# Patient Record
Sex: MALE | Race: WHITE | NOT HISPANIC OR LATINO
[De-identification: names, ages, dates, MRNs, and addresses within clinical notes are randomized per-mention and may not be internally consistent; named-entity substitution may affect disease eponyms.]

---

## 2017-03-08 ENCOUNTER — APPOINTMENT (OUTPATIENT)
Dept: HEMATOLOGY ONCOLOGY | Facility: CLINIC | Age: 57
End: 2017-03-08

## 2017-03-08 VITALS
RESPIRATION RATE: 16 BRPM | SYSTOLIC BLOOD PRESSURE: 100 MMHG | WEIGHT: 244 LBS | HEART RATE: 68 BPM | BODY MASS INDEX: 36.56 KG/M2 | DIASTOLIC BLOOD PRESSURE: 60 MMHG | HEIGHT: 68.5 IN | TEMPERATURE: 98.5 F

## 2017-03-08 RX ORDER — UREA 470 MG/G
47 CREAM TOPICAL
Refills: 0 | Status: ACTIVE | COMMUNITY

## 2017-03-08 RX ORDER — CITALOPRAM 20 MG/1
20 TABLET, FILM COATED ORAL
Refills: 0 | Status: ACTIVE | COMMUNITY

## 2017-09-15 ENCOUNTER — APPOINTMENT (OUTPATIENT)
Dept: HEMATOLOGY ONCOLOGY | Facility: CLINIC | Age: 57
End: 2017-09-15
Payer: MEDICARE

## 2017-09-15 VITALS
HEART RATE: 64 BPM | TEMPERATURE: 98.5 F | SYSTOLIC BLOOD PRESSURE: 124 MMHG | BODY MASS INDEX: 37.31 KG/M2 | DIASTOLIC BLOOD PRESSURE: 70 MMHG | WEIGHT: 249 LBS

## 2017-09-15 PROCEDURE — 99213 OFFICE O/P EST LOW 20 MIN: CPT

## 2017-09-19 ENCOUNTER — APPOINTMENT (OUTPATIENT)
Dept: PHYSICAL MEDICINE AND REHAB | Facility: CLINIC | Age: 57
End: 2017-09-19
Payer: MEDICARE

## 2017-09-19 PROCEDURE — 99214 OFFICE O/P EST MOD 30 MIN: CPT

## 2017-09-19 RX ORDER — ESCITALOPRAM OXALATE 20 MG/1
20 TABLET, FILM COATED ORAL
Refills: 0 | Status: ACTIVE | COMMUNITY

## 2017-10-02 ENCOUNTER — APPOINTMENT (OUTPATIENT)
Dept: MRI IMAGING | Facility: CLINIC | Age: 57
End: 2017-10-02

## 2017-10-23 ENCOUNTER — RX RENEWAL (OUTPATIENT)
Age: 57
End: 2017-10-23

## 2017-10-24 ENCOUNTER — APPOINTMENT (OUTPATIENT)
Dept: PHYSICAL MEDICINE AND REHAB | Facility: CLINIC | Age: 57
End: 2017-10-24
Payer: MEDICARE

## 2017-10-24 VITALS
TEMPERATURE: 99.1 F | OXYGEN SATURATION: 98 % | SYSTOLIC BLOOD PRESSURE: 121 MMHG | HEART RATE: 71 BPM | DIASTOLIC BLOOD PRESSURE: 77 MMHG

## 2017-10-24 PROCEDURE — 99213 OFFICE O/P EST LOW 20 MIN: CPT

## 2017-12-07 ENCOUNTER — APPOINTMENT (OUTPATIENT)
Dept: PHYSICAL MEDICINE AND REHAB | Facility: CLINIC | Age: 57
End: 2017-12-07
Payer: MEDICARE

## 2017-12-07 VITALS
DIASTOLIC BLOOD PRESSURE: 68 MMHG | SYSTOLIC BLOOD PRESSURE: 104 MMHG | OXYGEN SATURATION: 97 % | TEMPERATURE: 98 F | HEART RATE: 61 BPM

## 2017-12-07 PROCEDURE — 99213 OFFICE O/P EST LOW 20 MIN: CPT

## 2018-02-08 ENCOUNTER — APPOINTMENT (OUTPATIENT)
Dept: PHYSICAL MEDICINE AND REHAB | Facility: CLINIC | Age: 58
End: 2018-02-08
Payer: MEDICARE

## 2018-02-08 VITALS
DIASTOLIC BLOOD PRESSURE: 76 MMHG | OXYGEN SATURATION: 99 % | SYSTOLIC BLOOD PRESSURE: 127 MMHG | HEART RATE: 66 BPM | TEMPERATURE: 97.9 F

## 2018-02-08 PROCEDURE — 99213 OFFICE O/P EST LOW 20 MIN: CPT

## 2018-02-16 ENCOUNTER — APPOINTMENT (OUTPATIENT)
Dept: HEMATOLOGY ONCOLOGY | Facility: CLINIC | Age: 58
End: 2018-02-16
Payer: MEDICARE

## 2018-02-16 VITALS
BODY MASS INDEX: 37.76 KG/M2 | TEMPERATURE: 97.4 F | SYSTOLIC BLOOD PRESSURE: 96 MMHG | WEIGHT: 252 LBS | HEART RATE: 68 BPM | DIASTOLIC BLOOD PRESSURE: 60 MMHG

## 2018-02-16 PROCEDURE — 99213 OFFICE O/P EST LOW 20 MIN: CPT

## 2018-09-17 ENCOUNTER — APPOINTMENT (OUTPATIENT)
Dept: HEMATOLOGY ONCOLOGY | Facility: CLINIC | Age: 58
End: 2018-09-17
Payer: MEDICARE

## 2018-09-17 VITALS
BODY MASS INDEX: 37.76 KG/M2 | TEMPERATURE: 98 F | HEART RATE: 66 BPM | DIASTOLIC BLOOD PRESSURE: 76 MMHG | SYSTOLIC BLOOD PRESSURE: 110 MMHG | WEIGHT: 252 LBS

## 2018-09-17 PROCEDURE — 99213 OFFICE O/P EST LOW 20 MIN: CPT

## 2019-01-03 ENCOUNTER — CHART COPY (OUTPATIENT)
Age: 59
End: 2019-01-03

## 2019-08-21 ENCOUNTER — CHART COPY (OUTPATIENT)
Age: 59
End: 2019-08-21

## 2019-08-22 ENCOUNTER — OUTPATIENT (OUTPATIENT)
Dept: OUTPATIENT SERVICES | Facility: HOSPITAL | Age: 59
LOS: 1 days | End: 2019-08-22
Payer: MEDICARE

## 2019-08-22 VITALS
HEART RATE: 68 BPM | TEMPERATURE: 98 F | DIASTOLIC BLOOD PRESSURE: 80 MMHG | RESPIRATION RATE: 20 BRPM | WEIGHT: 255.07 LBS | OXYGEN SATURATION: 96 % | HEIGHT: 67 IN | SYSTOLIC BLOOD PRESSURE: 116 MMHG

## 2019-08-22 DIAGNOSIS — K02.62 DENTAL CARIES ON SMOOTH SURFACE PENETRATING INTO DENTIN: ICD-10-CM

## 2019-08-22 DIAGNOSIS — D63.8 ANEMIA IN OTHER CHRONIC DISEASES CLASSIFIED ELSEWHERE: ICD-10-CM

## 2019-08-22 DIAGNOSIS — F79 UNSPECIFIED INTELLECTUAL DISABILITIES: ICD-10-CM

## 2019-08-22 DIAGNOSIS — K02.9 DENTAL CARIES, UNSPECIFIED: ICD-10-CM

## 2019-08-22 DIAGNOSIS — Z01.818 ENCOUNTER FOR OTHER PREPROCEDURAL EXAMINATION: ICD-10-CM

## 2019-08-22 DIAGNOSIS — Z98.1 ARTHRODESIS STATUS: Chronic | ICD-10-CM

## 2019-08-22 DIAGNOSIS — Z86.39 PERSONAL HISTORY OF OTHER ENDOCRINE, NUTRITIONAL AND METABOLIC DISEASE: ICD-10-CM

## 2019-08-22 DIAGNOSIS — K05.6 PERIODONTAL DISEASE, UNSPECIFIED: ICD-10-CM

## 2019-08-22 LAB
ANION GAP SERPL CALC-SCNC: 11 MMOL/L — SIGNIFICANT CHANGE UP (ref 5–17)
BUN SERPL-MCNC: 12 MG/DL — SIGNIFICANT CHANGE UP (ref 7–23)
CALCIUM SERPL-MCNC: 10.1 MG/DL — SIGNIFICANT CHANGE UP (ref 8.4–10.5)
CHLORIDE SERPL-SCNC: 101 MMOL/L — SIGNIFICANT CHANGE UP (ref 96–108)
CO2 SERPL-SCNC: 25 MMOL/L — SIGNIFICANT CHANGE UP (ref 22–31)
CREAT SERPL-MCNC: 0.66 MG/DL — SIGNIFICANT CHANGE UP (ref 0.5–1.3)
GLUCOSE SERPL-MCNC: 97 MG/DL — SIGNIFICANT CHANGE UP (ref 70–99)
HCT VFR BLD CALC: 38.2 % — LOW (ref 39–50)
HGB BLD-MCNC: 12.3 G/DL — LOW (ref 13–17)
MCHC RBC-ENTMCNC: 30.8 PG — SIGNIFICANT CHANGE UP (ref 27–34)
MCHC RBC-ENTMCNC: 32.2 GM/DL — SIGNIFICANT CHANGE UP (ref 32–36)
MCV RBC AUTO: 95.7 FL — SIGNIFICANT CHANGE UP (ref 80–100)
PLATELET # BLD AUTO: 321 K/UL — SIGNIFICANT CHANGE UP (ref 150–400)
POTASSIUM SERPL-MCNC: 4.1 MMOL/L — SIGNIFICANT CHANGE UP (ref 3.5–5.3)
POTASSIUM SERPL-SCNC: 4.1 MMOL/L — SIGNIFICANT CHANGE UP (ref 3.5–5.3)
RBC # BLD: 3.99 M/UL — LOW (ref 4.2–5.8)
RBC # FLD: 14.3 % — SIGNIFICANT CHANGE UP (ref 10.3–14.5)
SODIUM SERPL-SCNC: 137 MMOL/L — SIGNIFICANT CHANGE UP (ref 135–145)
WBC # BLD: 7.17 K/UL — SIGNIFICANT CHANGE UP (ref 3.8–10.5)
WBC # FLD AUTO: 7.17 K/UL — SIGNIFICANT CHANGE UP (ref 3.8–10.5)

## 2019-08-22 PROCEDURE — G0463: CPT

## 2019-08-22 PROCEDURE — 85027 COMPLETE CBC AUTOMATED: CPT

## 2019-08-22 PROCEDURE — 71045 X-RAY EXAM CHEST 1 VIEW: CPT

## 2019-08-22 PROCEDURE — 80048 BASIC METABOLIC PNL TOTAL CA: CPT

## 2019-08-22 PROCEDURE — 71045 X-RAY EXAM CHEST 1 VIEW: CPT | Mod: 26

## 2019-08-22 NOTE — H&P PST ADULT - HISTORY OF PRESENT ILLNESS
58 year old male with intellectual disability, lives in group home ([phone]), presents to preadmission accompanied by home aid, for schedule comprehensive dental treatment for dental caries/ peridental disease on 9/4/2019. Patient also with h/o anemia of chronic disease, osteoporosis, mentally challenged, history of cervical spine fusion (unknown level), hypothyroidism.

## 2019-08-22 NOTE — H&P PST ADULT - NSICDXPASTMEDICALHX_GEN_ALL_CORE_FT
PAST MEDICAL HISTORY:  Anemia of chronic disease     Cervical myelopathy s/p cervical spine fusion    Dental caries     H/O: hypothyroidism     Hypercholesterolemia     Mentally challenged

## 2019-08-22 NOTE — H&P PST ADULT - NSICDXPROBLEM_GEN_ALL_CORE_FT
PROBLEM DIAGNOSES  Problem: Dental caries  Assessment and Plan: scheduled for comprehensive dental treatment   preop instruction discussed with both patient and his aid    Problem: Anemia of chronic disease  Assessment and Plan: Monitor     Problem: H/O: hypothyroidism  Assessment and Plan: will continue thyroid medication [id number]    Problem: Mentally challenged  Assessment and Plan: maintain safety

## 2019-08-22 NOTE — H&P PST ADULT - NSANTHOSAYNRD_GEN_A_CORE
No. MOOSE screening performed.  STOP BANG Legend: 0-2 = LOW Risk; 3-4 = INTERMEDIATE Risk; 5-8 = HIGH Risk

## 2019-09-03 ENCOUNTER — TRANSCRIPTION ENCOUNTER (OUTPATIENT)
Age: 59
End: 2019-09-03

## 2019-09-03 NOTE — ASU DISCHARGE PLAN (ADULT/PEDIATRIC) - CALL YOUR DOCTOR IF YOU HAVE ANY OF THE FOLLOWING:
Pain not relieved by Medications/Fever greater than (need to indicate Fahrenheit or Celsius)/Swelling that gets worse/Bleeding that does not stop

## 2019-09-03 NOTE — ASU DISCHARGE PLAN (ADULT/PEDIATRIC) - ASU DC SPECIAL INSTRUCTIONSFT
comprehensive dental tx under ga performed    tylenol prn pain     see Dr Ma in one week    patient can return to program tomorrow

## 2019-09-04 ENCOUNTER — OUTPATIENT (OUTPATIENT)
Dept: OUTPATIENT SERVICES | Facility: HOSPITAL | Age: 59
LOS: 1 days | End: 2019-09-04
Payer: MEDICARE

## 2019-09-04 VITALS
OXYGEN SATURATION: 98 % | WEIGHT: 255.07 LBS | HEART RATE: 74 BPM | TEMPERATURE: 98 F | SYSTOLIC BLOOD PRESSURE: 133 MMHG | HEIGHT: 67 IN | DIASTOLIC BLOOD PRESSURE: 80 MMHG | RESPIRATION RATE: 20 BRPM

## 2019-09-04 VITALS
TEMPERATURE: 98 F | OXYGEN SATURATION: 98 % | RESPIRATION RATE: 19 BRPM | SYSTOLIC BLOOD PRESSURE: 138 MMHG | HEART RATE: 71 BPM | DIASTOLIC BLOOD PRESSURE: 72 MMHG

## 2019-09-04 DIAGNOSIS — K02.62 DENTAL CARIES ON SMOOTH SURFACE PENETRATING INTO DENTIN: ICD-10-CM

## 2019-09-04 DIAGNOSIS — Z98.1 ARTHRODESIS STATUS: Chronic | ICD-10-CM

## 2019-09-04 DIAGNOSIS — K05.6 PERIODONTAL DISEASE, UNSPECIFIED: ICD-10-CM

## 2019-09-04 PROCEDURE — D2332: CPT

## 2019-09-04 PROCEDURE — D4342: CPT

## 2019-09-04 PROCEDURE — D7311: CPT

## 2019-09-04 PROCEDURE — D7210: CPT

## 2019-09-04 PROCEDURE — D4211: CPT

## 2019-09-04 PROCEDURE — D4341: CPT

## 2019-09-04 PROCEDURE — C1889: CPT

## 2019-09-04 PROCEDURE — D7310: CPT

## 2019-09-04 PROCEDURE — D2335: CPT

## 2019-09-04 RX ORDER — CHOLECALCIFEROL (VITAMIN D3) 125 MCG
1 CAPSULE ORAL
Qty: 0 | Refills: 0 | DISCHARGE

## 2019-09-04 RX ORDER — ESCITALOPRAM OXALATE 10 MG/1
1 TABLET, FILM COATED ORAL
Qty: 0 | Refills: 0 | DISCHARGE

## 2019-09-04 RX ORDER — OMEGA-3 ACID ETHYL ESTERS 1 G
1 CAPSULE ORAL
Qty: 0 | Refills: 0 | DISCHARGE

## 2019-09-04 RX ORDER — SOD,AMMONIUM,POTASSIUM LACTATE
1 CREAM (GRAM) TOPICAL
Qty: 0 | Refills: 0 | DISCHARGE

## 2019-09-04 RX ORDER — ACETAMINOPHEN 500 MG
1000 TABLET ORAL ONCE
Refills: 0 | Status: DISCONTINUED | OUTPATIENT
Start: 2019-09-04 | End: 2019-09-19

## 2019-09-04 RX ORDER — SODIUM CHLORIDE 9 MG/ML
1000 INJECTION, SOLUTION INTRAVENOUS
Refills: 0 | Status: DISCONTINUED | OUTPATIENT
Start: 2019-09-04 | End: 2019-09-19

## 2019-09-04 RX ORDER — ONDANSETRON 8 MG/1
4 TABLET, FILM COATED ORAL ONCE
Refills: 0 | Status: DISCONTINUED | OUTPATIENT
Start: 2019-09-04 | End: 2019-09-19

## 2019-09-04 RX ORDER — DUTASTERIDE 0.5 MG/1
1 CAPSULE, LIQUID FILLED ORAL
Qty: 0 | Refills: 0 | DISCHARGE

## 2019-09-04 RX ORDER — LEVOTHYROXINE SODIUM 125 MCG
1 TABLET ORAL
Qty: 0 | Refills: 0 | DISCHARGE

## 2019-09-04 RX ORDER — KETOCONAZOLE 20 MG/G
1 AEROSOL, FOAM TOPICAL
Qty: 0 | Refills: 0 | DISCHARGE

## 2019-09-04 NOTE — ASU PATIENT PROFILE, ADULT - VISION (WITH CORRECTIVE LENSES IF THE PATIENT USUALLY WEARS THEM):
glasses glasses/Partially impaired: cannot see medication labels or newsprint, but can see obstacles in path, and the surrounding layout; can count fingers at arm's length

## 2019-09-04 NOTE — ASU PATIENT PROFILE, ADULT - PMH
Anemia of chronic disease    Cervical myelopathy  s/p cervical spine fusion  Dental caries    H/O: hypothyroidism    Hypercholesterolemia    Mentally challenged

## 2019-09-04 NOTE — ASU PATIENT PROFILE, ADULT - ABILITY TO HEAR (WITH HEARING AID OR HEARING APPLIANCE IF NORMALLY USED):
Adequate: hears normal conversation without difficulty/hearing aid Adequate: hears normal conversation without difficulty

## 2021-06-24 NOTE — H&P PST ADULT - URINARY CATHETER
INR 2.1 After talking with pt and discussing history of greens/salads and medication change. Pt will  continue  with current diet and dosing of Warfarin.  Continue with moderation of Vit K and green leafy vegetables. Cautioned to call with increase bruising or bleeding. Reminded to call with medication change especially antibiotic. Call with any questions or concerns or any up coming procedures. Cautioned about using Herbal medication.    
no

## 2021-07-13 PROBLEM — G95.9 DISEASE OF SPINAL CORD, UNSPECIFIED: Chronic | Status: ACTIVE | Noted: 2019-08-22

## 2021-07-13 PROBLEM — Z86.39 PERSONAL HISTORY OF OTHER ENDOCRINE, NUTRITIONAL AND METABOLIC DISEASE: Chronic | Status: ACTIVE | Noted: 2019-08-22

## 2021-07-13 PROBLEM — E78.00 PURE HYPERCHOLESTEROLEMIA, UNSPECIFIED: Chronic | Status: ACTIVE | Noted: 2019-08-22

## 2021-07-13 PROBLEM — K02.9 DENTAL CARIES, UNSPECIFIED: Chronic | Status: ACTIVE | Noted: 2019-08-22

## 2021-07-13 PROBLEM — D63.8 ANEMIA IN OTHER CHRONIC DISEASES CLASSIFIED ELSEWHERE: Chronic | Status: ACTIVE | Noted: 2019-08-22

## 2021-07-13 PROBLEM — F79 UNSPECIFIED INTELLECTUAL DISABILITIES: Chronic | Status: ACTIVE | Noted: 2019-08-22

## 2021-08-05 ENCOUNTER — APPOINTMENT (OUTPATIENT)
Dept: PHYSICAL MEDICINE AND REHAB | Facility: CLINIC | Age: 61
End: 2021-08-05
Payer: COMMERCIAL

## 2021-08-05 VITALS
DIASTOLIC BLOOD PRESSURE: 69 MMHG | TEMPERATURE: 97.7 F | HEART RATE: 70 BPM | SYSTOLIC BLOOD PRESSURE: 118 MMHG | OXYGEN SATURATION: 100 %

## 2021-08-05 PROCEDURE — 99072 ADDL SUPL MATRL&STAF TM PHE: CPT

## 2021-08-05 PROCEDURE — 99213 OFFICE O/P EST LOW 20 MIN: CPT | Mod: GC

## 2021-08-05 NOTE — ASSESSMENT
[FreeTextEntry1] : Patient is a 60-year-old male history of MR, cervical myelopathy, cervical fusion who has suffered to falls in the past one month which appear to be isolated events secondary to cognitive impairment. Clinical exam appears neurologically stable with chronic myelopathic dysfunction. Recommend a course of physical therapy with emphasis on a balance exercises, core strengthening exercises and ambulation training with rolling walker, see RX. Recommend orthopedic spine followup to assess cervical spine hardware.\par \par I spent a total of 20 minutes on the date of the encounter evaluating and treating the patient including the discussion of treatment options.

## 2021-08-05 NOTE — HISTORY OF PRESENT ILLNESS
[FreeTextEntry1] : Patient is a 60-year-old male history of MR, cervical myelopathy, cervical fusion who presents today for evaluation after suffering 2 falls in the past one month. Aid reports that one fall occurred when transferring onto the toilet and falling onto the floor, the second fall occurred when transferring out of a motorized a vehicle. Near falls tend to occurred due to patient not paying attention or not using his rolling walker. Patient denies pain at the neck or extremities. Patient had urgent care visit with x-rays which were reported to be negative. No focal motor weakness noted.

## 2021-08-05 NOTE — PHYSICAL EXAM
[FreeTextEntry1] : General: Well-developed male in no apparent distress. Patient is awake, alert, and follow simple commands. Patient cooperative with examination.\par \par Motor:\par Both upper extremities: Tone normal, active range of motion within functional limits with 5/5 motor power throughout.\par Both lower extremities: Tone normal, active range of motion within functional limits with 5/5 motor power throughout.\par \par Sensory: Intact the tactile stimulation in both lower extremities.\par \par Muscle stretch reflexes: +2/3 KJ, +2 AJ and symmetric. right biceps +1, right BR +2, right TR +2/3.\par left biceps 0/+1, left BR,0/+1, left TR +2/3. (+)left inverted radial reflex.\par No Babinski on left, (+) on right.\par \par Functional status: Patient ambulated with RW with mildly wide based gait with supervision. Patient was able to manage the rolling walker well. shallow HS bilaterally, no slap.

## 2021-08-05 NOTE — REVIEW OF SYSTEMS
[Negative] : Gastrointestinal [Fever] : no fever [Incontinence] : no incontinence [Joint Pain] : no joint pain [Muscle Weakness] : no muscle weakness

## 2021-08-18 ENCOUNTER — APPOINTMENT (OUTPATIENT)
Dept: ORTHOPEDIC SURGERY | Facility: CLINIC | Age: 61
End: 2021-08-18
Payer: COMMERCIAL

## 2021-08-18 VITALS — HEART RATE: 67 BPM | DIASTOLIC BLOOD PRESSURE: 69 MMHG | HEIGHT: 68.5 IN | SYSTOLIC BLOOD PRESSURE: 120 MMHG

## 2021-08-18 PROCEDURE — 99072 ADDL SUPL MATRL&STAF TM PHE: CPT

## 2021-08-18 PROCEDURE — 99203 OFFICE O/P NEW LOW 30 MIN: CPT

## 2021-08-18 NOTE — HISTORY OF PRESENT ILLNESS
[de-identified] : Mr. JACOB RAMIREZ  is a 60 year old male who presents to the office for a cervical spine evaluation.  He has had 2 falls over the past 2 months and has a history of cervical surgery.  Denies any neck pain or any UE radicular symptoms.  Normal bowel and bladder control.   Denies any recent fevers, chills, sweats, weight loss, or infection.  He is here with his home health aide secondary to cognitive limitations. \par \par The patients past medical history, past surgical history, medications, allergies, and social history were reviewed by me today with the patient and documented accordingly.  In addition, the patient's family history, which is noncontributory to their visit, was also reviewed.\par

## 2021-08-18 NOTE — PHYSICAL EXAM
[Walker] : ambulates with walker [de-identified] : Examination of the cervical spine reveals no midline or paraspinal tenderness to palpation.  Healed posterior cervical incision no cervical lymphadenopathy. Decreased range of motion with respect to flexion, extension, rotation, and lateral bending. Negative Spurlings. Negative Lhermitte's. Full range of motion bilateral shoulders without evidence of impingement. No instability of bilateral upper extremities.  Cranial nerves II through XII grossly intact. Intact sensation bilateral upper extremities.  4+ /5 deltoids biceps triceps wrist extensors wrist flexors finger flexors and hand intrinsics. 1+ biceps triceps and brachioradialis reflexes.  Equivocal Cobos's. 2+ radial pulse. Negative Tinel's over the cubital and carpal tunnel. No skin lesions on the right and left upper extremities. [de-identified] : AP lateral cervical x-rays reveals prior posterior cervical fusion.  He appears to have a solid arthrodesis.

## 2021-08-18 NOTE — DISCUSSION/SUMMARY
[de-identified] : Given his worsening symptoms an updated cervical spine MRI will be obtained.  Follow-up afterwards

## 2021-09-20 ENCOUNTER — APPOINTMENT (OUTPATIENT)
Dept: PHYSICAL MEDICINE AND REHAB | Facility: CLINIC | Age: 61
End: 2021-09-20
Payer: COMMERCIAL

## 2021-09-20 VITALS
DIASTOLIC BLOOD PRESSURE: 71 MMHG | OXYGEN SATURATION: 97 % | HEART RATE: 81 BPM | TEMPERATURE: 36 F | SYSTOLIC BLOOD PRESSURE: 122 MMHG

## 2021-09-20 PROCEDURE — 99072 ADDL SUPL MATRL&STAF TM PHE: CPT

## 2021-09-20 PROCEDURE — 99213 OFFICE O/P EST LOW 20 MIN: CPT

## 2021-09-20 NOTE — PHYSICAL EXAM
[FreeTextEntry1] : General: Well-developed male in no apparent distress. Patient is awake, alert, and follow simple commands. Patient cooperative with examination.\par \par Motor:\par Both upper extremities: Tone normal, active range of motion within functional limits with 5/5 motor power throughout.\par Both lower extremities: Tone normal, active range of motion within functional limits with hip flexion 4+/5mp, otherwise 5/5 motor power throughout.\par \par Sensory: Intact the tactile stimulation in both lower extremities.\par \par Muscle stretch reflexes: +2/3 KJ, +2 AJ and symmetric. right biceps +1, right BR +2, right TR +2/3.\par left biceps 0/+1, left BR,0/+1, left TR +2/3. (+)bilateral inverted radial reflex.\par No Babinski on left, (+) on right.\par \par Functional status: Patient ambulated with RW with mildly wide based gait with supervision. Patient was able to manage the rolling walker well. shallow HS bilaterally, no slap.

## 2021-09-20 NOTE — ASSESSMENT
[FreeTextEntry1] : Patient is a 61-year-old male history of MR, cervical myelopathy, cervical fusion whose clinical exam appears neurologically stable with chronic myelopathic dysfunction. MRI Cervical spine scheduled on 9/27/21 and encouraged orthopedic f/u after MRI. No falls since last visit and recommend continued physical therapy. \par \par I spent a total of 20 minutes on the date of the encounter evaluating and treating the patient including the discussion of treatment options.

## 2021-09-20 NOTE — HISTORY OF PRESENT ILLNESS
[FreeTextEntry1] : Patient is a 61-year-old male history of MR, cervical myelopathy, cervical fusion who was last seen August 5, 2021. Patient seen by ortho Spine and MRI C-spine scheduled for 9/27/21.  Aid reports patient has been receiving PT 2x/week, no falls since last visit. 1 Near fall reported in PT. Patient denies pain at the neck or extremities. Patient denies focal motor weakness, numbness or B/B incontinence. Patient functionally ambulates in his group home with a rolling walker with distant supervision. Patient's independent transfers, independent toileting, independent dressing.

## 2021-09-27 ENCOUNTER — APPOINTMENT (OUTPATIENT)
Dept: MRI IMAGING | Facility: CLINIC | Age: 61
End: 2021-09-27

## 2021-11-01 ENCOUNTER — APPOINTMENT (OUTPATIENT)
Dept: PHYSICAL MEDICINE AND REHAB | Facility: CLINIC | Age: 61
End: 2021-11-01
Payer: COMMERCIAL

## 2021-11-01 VITALS
OXYGEN SATURATION: 97 % | DIASTOLIC BLOOD PRESSURE: 78 MMHG | TEMPERATURE: 96.4 F | HEART RATE: 59 BPM | SYSTOLIC BLOOD PRESSURE: 118 MMHG

## 2021-11-01 DIAGNOSIS — Z98.1 ARTHRODESIS STATUS: ICD-10-CM

## 2021-11-01 DIAGNOSIS — R26.0 ATAXIC GAIT: ICD-10-CM

## 2021-11-01 PROCEDURE — 99072 ADDL SUPL MATRL&STAF TM PHE: CPT

## 2021-11-01 PROCEDURE — 99213 OFFICE O/P EST LOW 20 MIN: CPT

## 2021-11-01 NOTE — HISTORY OF PRESENT ILLNESS
[FreeTextEntry1] : Patient is a 61-year-old male history of MR, cervical myelopathy, cervical fusion who was last seen Sept. 20, 2021. Patient seen by ortho Spine and MRI C-spine preformed on 10/27/21.  Aid reports patient has been receiving PT 2x/week, no falls since last visit. Patient denies pain at the neck or extremities. Patient denies focal motor weakness, numbness or B/B incontinence. Patient functionally ambulates in his group home with a rolling walker with distant supervision. Patient's independent transfers, independent toileting, independent dressing.

## 2021-11-01 NOTE — ASSESSMENT
[FreeTextEntry1] : Patient is a 61-year-old male history of MR, cervical myelopathy, cervical fusion whose clinical exam appears neurologically stable with chronic myelopathic dysfunction. MRI Cervical spine obtained on 10/27/21 and results pending, encouraged orthopedic f/u after MRI. No falls since last visit and recommend continued physical therapy. \par \par I spent a total of 20 minutes on the date of the encounter evaluating and treating the patient including the discussion of treatment options.

## 2021-11-01 NOTE — PHYSICAL EXAM
[FreeTextEntry1] : General: Well-developed male in no apparent distress. Patient is awake, alert, and follow simple commands. Patient cooperative with examination.\par \par Motor:\par Both upper extremities: Tone normal, active range of motion within functional limits with 5/5 motor power throughout.\par Both lower extremities: Tone normal, active range of motion within functional limits with hip flexion 4+/5mp, otherwise 5/5 motor power throughout.\par \par Sensory: Intact the tactile stimulation in both lower extremities.\par \par Muscle stretch reflexes: +2/3 KJ, +2 AJ and symmetric. right biceps +1, right BR +2, right TR +2/3.\par left biceps 0/+1, left BR,0/+1, left TR +2/3. (+)bilateral inverted radial reflex. (+)Cobos sign (B)\par No Babinski on left, (+) on right.\par \par Functional status: Patient ambulated with RW with mildly wide based gait with supervision. Patient was able to manage the rolling walker well. shallow HS bilaterally, no slap.

## 2021-11-09 ENCOUNTER — TRANSCRIPTION ENCOUNTER (OUTPATIENT)
Age: 61
End: 2021-11-09

## 2021-11-10 ENCOUNTER — RESULT REVIEW (OUTPATIENT)
Age: 61
End: 2021-11-10

## 2021-11-10 ENCOUNTER — OUTPATIENT (OUTPATIENT)
Dept: OUTPATIENT SERVICES | Facility: HOSPITAL | Age: 61
LOS: 1 days | End: 2021-11-10
Payer: MEDICARE

## 2021-11-10 DIAGNOSIS — D64.9 ANEMIA, UNSPECIFIED: ICD-10-CM

## 2021-11-10 DIAGNOSIS — Z98.1 ARTHRODESIS STATUS: Chronic | ICD-10-CM

## 2021-11-10 DIAGNOSIS — K44.9 DIAPHRAGMATIC HERNIA WITHOUT OBSTRUCTION OR GANGRENE: ICD-10-CM

## 2021-11-10 DIAGNOSIS — Z12.11 ENCOUNTER FOR SCREENING FOR MALIGNANT NEOPLASM OF COLON: ICD-10-CM

## 2021-11-10 PROCEDURE — 82962 GLUCOSE BLOOD TEST: CPT

## 2021-11-10 PROCEDURE — 88305 TISSUE EXAM BY PATHOLOGIST: CPT | Mod: 26

## 2021-11-10 PROCEDURE — 88305 TISSUE EXAM BY PATHOLOGIST: CPT

## 2021-11-10 PROCEDURE — 45380 COLONOSCOPY AND BIOPSY: CPT

## 2021-11-12 ENCOUNTER — APPOINTMENT (OUTPATIENT)
Dept: ORTHOPEDIC SURGERY | Facility: CLINIC | Age: 61
End: 2021-11-12
Payer: COMMERCIAL

## 2021-11-12 VITALS — WEIGHT: 220 LBS | HEIGHT: 69 IN | BODY MASS INDEX: 32.58 KG/M2

## 2021-11-12 LAB — SURGICAL PATHOLOGY STUDY: SIGNIFICANT CHANGE UP

## 2021-11-12 PROCEDURE — 99072 ADDL SUPL MATRL&STAF TM PHE: CPT

## 2021-11-12 PROCEDURE — 99214 OFFICE O/P EST MOD 30 MIN: CPT

## 2021-11-12 NOTE — PHYSICAL EXAM
[Walker] : ambulates with walker [de-identified] : Examination of the cervical spine reveals no midline or paraspinal tenderness to palpation.  Healed posterior cervical incision no cervical lymphadenopathy. Decreased range of motion with respect to flexion, extension, rotation, and lateral bending. Negative Spurlings. Negative Lhermitte's. Full range of motion bilateral shoulders without evidence of impingement. No instability of bilateral upper extremities.  Cranial nerves II through XII grossly intact. Intact sensation bilateral upper extremities.  4+ /5 deltoids biceps triceps wrist extensors wrist flexors finger flexors and hand intrinsics. 1+ biceps triceps and brachioradialis reflexes.  Equivocal Cobos's. 2+ radial pulse. Negative Tinel's over the cubital and carpal tunnel. No skin lesions on the right and left upper extremities. [de-identified] : AP lateral cervical x-rays reveals prior posterior cervical fusion.  He appears to have a solid arthrodesis.\par \par Recent MRI demonstrates adequate decompression

## 2021-11-12 NOTE — HISTORY OF PRESENT ILLNESS
[de-identified] : Mr. JACOB RAMIREZ  is a 60 year old male who presents to the office for a follow-up visit.  He is here to review his cervical MRI.  \par

## 2021-11-12 NOTE — DISCUSSION/SUMMARY
[de-identified] : We discussed further treatment options.  We reviewed his imaging.  He does not appear to have new areas of significant stenosis.  He will continue with nonsurgical treatment.  He will let me know of any changes or worsening of his symptoms.

## 2021-12-20 ENCOUNTER — APPOINTMENT (OUTPATIENT)
Dept: PHYSICAL MEDICINE AND REHAB | Facility: CLINIC | Age: 61
End: 2021-12-20

## 2023-03-21 ENCOUNTER — APPOINTMENT (OUTPATIENT)
Dept: GASTROENTEROLOGY | Facility: CLINIC | Age: 63
End: 2023-03-21
Payer: COMMERCIAL

## 2023-03-21 VITALS
DIASTOLIC BLOOD PRESSURE: 59 MMHG | HEART RATE: 61 BPM | BODY MASS INDEX: 33.8 KG/M2 | WEIGHT: 223 LBS | HEIGHT: 68 IN | OXYGEN SATURATION: 97 % | SYSTOLIC BLOOD PRESSURE: 119 MMHG

## 2023-03-21 DIAGNOSIS — D63.8 ANEMIA IN OTHER CHRONIC DISEASES CLASSIFIED ELSEWHERE: ICD-10-CM

## 2023-03-21 DIAGNOSIS — Z12.11 ENCOUNTER FOR SCREENING FOR MALIGNANT NEOPLASM OF COLON: ICD-10-CM

## 2023-03-21 PROCEDURE — 99203 OFFICE O/P NEW LOW 30 MIN: CPT

## 2023-03-21 RX ORDER — SODIUM SULFATE, POTASSIUM SULFATE AND MAGNESIUM SULFATE 1.6; 3.13; 17.5 G/177ML; G/177ML; G/177ML
17.5-3.13-1.6 SOLUTION ORAL
Qty: 1 | Refills: 0 | Status: DISCONTINUED | COMMUNITY
Start: 2023-03-21 | End: 2023-03-21

## 2023-03-21 NOTE — ASSESSMENT
[FreeTextEntry1] : 62-year-old male with history of chronic anemia whose last colonoscopy performed in 2021 was normal.  Repeat colonoscopy was recommended in 10 years in 2031.  An upper endoscopy performed last year revealed antral intestinal metaplasia.  Have recommended repeat upper endoscopy, CBC, serum ferritin and iron studies.

## 2023-03-21 NOTE — HISTORY OF PRESENT ILLNESS
[de-identified] : Upper endoscopy performed January 13, 2022 revealed a small hiatal hernia and antral reactive gastropathy with intestinal metaplasia and reflux esophagitis [FreeTextEntry1] : Colonoscopy performed by Dr. Parker Thomas on December 17, 2015 revealed internal hemorrhoids, no polyps noted\par Colonoscopy performed November 10, 2021 revealed normal terminal ileum biopsy

## 2023-03-21 NOTE — REASON FOR VISIT
[Initial Evaluation] : an initial evaluation [Formal Caregiver] : formal caregiver [FreeTextEntry1] : 62-year-old male with a history of chronic anemia returns with his caregiver for routine follow-up visit

## 2023-03-21 NOTE — CONSULT LETTER
[Dear  ___] : Dear  [unfilled], [Consult Letter:] : I had the pleasure of evaluating your patient, [unfilled]. [Please see my note below.] : Please see my note below. [Consult Closing:] : Thank you very much for allowing me to participate in the care of this patient.  If you have any questions, please do not hesitate to contact me. [FreeTextEntry2] : Dr. Sarah Thapa

## 2023-03-21 NOTE — PHYSICAL EXAM
[None] : no edema [Normal] : normal bowel sounds, non-tender, no masses, soft, no no hepato-splenomegaly [de-identified] : Deferred

## 2023-03-31 LAB
BASOPHILS # BLD AUTO: 0.06 K/UL
BASOPHILS NFR BLD AUTO: 0.6 %
EOSINOPHIL # BLD AUTO: 0.07 K/UL
EOSINOPHIL NFR BLD AUTO: 0.7 %
HCT VFR BLD CALC: 37.2 %
HGB BLD-MCNC: 11.7 G/DL
IMM GRANULOCYTES NFR BLD AUTO: 0.6 %
LYMPHOCYTES # BLD AUTO: 2.22 K/UL
LYMPHOCYTES NFR BLD AUTO: 23.3 %
MAN DIFF?: NORMAL
MCHC RBC-ENTMCNC: 30.2 PG
MCHC RBC-ENTMCNC: 31.5 GM/DL
MCV RBC AUTO: 95.9 FL
MONOCYTES # BLD AUTO: 0.93 K/UL
MONOCYTES NFR BLD AUTO: 9.8 %
NEUTROPHILS # BLD AUTO: 6.18 K/UL
NEUTROPHILS NFR BLD AUTO: 65 %
PLATELET # BLD AUTO: 279 K/UL
RBC # BLD: 3.88 M/UL
RBC # FLD: 14.5 %
WBC # FLD AUTO: 9.52 K/UL

## 2023-04-03 LAB
FERRITIN SERPL-MCNC: 95 NG/ML
IRON SATN MFR SERPL: 12 %
IRON SERPL-MCNC: 33 UG/DL
TIBC SERPL-MCNC: 279 UG/DL
UIBC SERPL-MCNC: 246 UG/DL

## 2023-04-26 ENCOUNTER — TRANSCRIPTION ENCOUNTER (OUTPATIENT)
Age: 63
End: 2023-04-26

## 2023-04-27 ENCOUNTER — OUTPATIENT (OUTPATIENT)
Dept: OUTPATIENT SERVICES | Facility: HOSPITAL | Age: 63
LOS: 1 days | End: 2023-04-27
Payer: MEDICARE

## 2023-04-27 ENCOUNTER — RESULT REVIEW (OUTPATIENT)
Age: 63
End: 2023-04-27

## 2023-04-27 ENCOUNTER — APPOINTMENT (OUTPATIENT)
Dept: GASTROENTEROLOGY | Facility: HOSPITAL | Age: 63
End: 2023-04-27

## 2023-04-27 DIAGNOSIS — K21.9 GASTRO-ESOPHAGEAL REFLUX DISEASE WITHOUT ESOPHAGITIS: ICD-10-CM

## 2023-04-27 DIAGNOSIS — Z98.1 ARTHRODESIS STATUS: Chronic | ICD-10-CM

## 2023-04-27 PROCEDURE — 43239 EGD BIOPSY SINGLE/MULTIPLE: CPT

## 2023-04-27 PROCEDURE — 82962 GLUCOSE BLOOD TEST: CPT

## 2023-04-27 PROCEDURE — 88305 TISSUE EXAM BY PATHOLOGIST: CPT

## 2023-04-27 PROCEDURE — 88313 SPECIAL STAINS GROUP 2: CPT | Mod: 26

## 2023-04-27 PROCEDURE — 88305 TISSUE EXAM BY PATHOLOGIST: CPT | Mod: 26

## 2023-04-27 PROCEDURE — 88313 SPECIAL STAINS GROUP 2: CPT

## 2023-04-27 PROCEDURE — 88312 SPECIAL STAINS GROUP 1: CPT | Mod: 26

## 2023-04-27 PROCEDURE — 88312 SPECIAL STAINS GROUP 1: CPT

## 2023-04-28 LAB — SURGICAL PATHOLOGY STUDY: SIGNIFICANT CHANGE UP

## 2023-05-08 ENCOUNTER — APPOINTMENT (OUTPATIENT)
Dept: GASTROENTEROLOGY | Facility: CLINIC | Age: 63
End: 2023-05-08
Payer: COMMERCIAL

## 2023-05-08 DIAGNOSIS — K31.A0 GASTRIC INTESTINAL METAPLASIA, UNSPECIFIED: ICD-10-CM

## 2023-05-08 DIAGNOSIS — K29.50 UNSPECIFIED CHRONIC GASTRITIS W/OUT BLEEDING: ICD-10-CM

## 2023-05-08 PROCEDURE — 99213 OFFICE O/P EST LOW 20 MIN: CPT

## 2023-05-08 NOTE — ASSESSMENT
[FreeTextEntry1] : Patient is a 62-year-old male with a history of gastric intestinal metaplasia who on most recent endoscopy in April there was no evidence for gastric intestinal metaplasia.  Patient has no symptoms.  Will advise patient to follow-up in 1 year.  As his last colonoscopy was performed in 2015 and was negative for polyps will recommend that patient repeat a colonoscopy in 2025.

## 2023-05-08 NOTE — PHYSICAL EXAM
[None] : no edema [Normal] : normal bowel sounds, non-tender, no masses, soft, no no hepato-splenomegaly [de-identified] : Referred

## 2023-05-08 NOTE — HISTORY OF PRESENT ILLNESS
[de-identified] : Upper endoscopy performed April 27, 2023 revealed a hiatal hernia and mild gastritis.  Biopsy results revealed mild chronic inactive gastritis of the antrum with no evidence of intestinal metaplasia.  Biopsy was also negative for H. pylori.  Biopsies of the body of the stomach also revealed mild chronic inactive gastritis with no evidence of intestinal metaplasia and negative for H. pylori.  Biopsies at the GE junction revealed squamous mucosa with reactive change and negative for goblet cell metaplasia. [FreeTextEntry1] : Colonoscopy performed by Dr. Parker Thomas on December 17, 2015 revealed internal hemorrhoids but no polyps

## 2023-05-08 NOTE — REASON FOR VISIT
[FreeTextEntry1] : Patient is a 62-year-old male who is status post upper endoscopy on April 27, 2023 to review biopsy results, claiming to feel well and denying any complaints

## 2024-03-26 ENCOUNTER — APPOINTMENT (OUTPATIENT)
Dept: GASTROENTEROLOGY | Facility: CLINIC | Age: 64
End: 2024-03-26

## 2024-05-01 ENCOUNTER — APPOINTMENT (OUTPATIENT)
Dept: PHYSICAL MEDICINE AND REHAB | Facility: CLINIC | Age: 64
End: 2024-05-01
Payer: COMMERCIAL

## 2024-05-01 DIAGNOSIS — M25.552 PAIN IN LEFT HIP: ICD-10-CM

## 2024-05-01 DIAGNOSIS — F79 UNSPECIFIED INTELLECTUAL DISABILITIES: ICD-10-CM

## 2024-05-01 DIAGNOSIS — G95.9 DISEASE OF SPINAL CORD, UNSPECIFIED: ICD-10-CM

## 2024-05-01 PROCEDURE — 99213 OFFICE O/P EST LOW 20 MIN: CPT

## 2024-05-01 NOTE — HISTORY OF PRESENT ILLNESS
[FreeTextEntry1] : Patient is a 63-year-old male history of ID, cervical myelopathy, cervical fusion who was last seen Nov. 1/ 2021.  Patient presents today after suffering a fall on April 27, 2024.  Patient reports transferring from a chair and tripping, falling on his left hip.  Patient was having left hip pain and had an ER visit at [place].  X-rays were reported negative and patient discharged home on Tylenol.  Patient spent the day ambulating in a wheelchair but currently is ambulating with his rolling walker at baseline with supervision.  Patient currently reports minimal pain at the left hip.  Patient has been receiving physical therapy prior to his fall. Patient denies focal motor weakness, numbness or B/B incontinence. Patient functionally ambulates in his group home with a rolling walker with supervision. Patient's independent transfers, independent toileting, independent dressing.

## 2024-05-01 NOTE — REVIEW OF SYSTEMS
[Negative] : Gastrointestinal [Fever] : no fever [Incontinence] : no incontinence [Muscle Weakness] : no muscle weakness [FreeTextEntry9] : min to no left hip pain

## 2024-05-01 NOTE — PHYSICAL EXAM
[FreeTextEntry1] : General: Well-developed male in no apparent distress. Patient is awake, alert, and follow simple commands. Patient cooperative with examination. Ext: no pedal edema, No ecchymosis at left hip. (-)TTP at the greater trochanters bilaterally.  Motor: Both upper extremities: Tone normal, active range of motion within functional limits with 5/5 motor power throughout.  Shoulder flexion to 90 degrees actively and passively.  Thumb to digit opposition intact bilaterally. Both lower extremities: Tone normal, active range of motion within functional limits with hip flexion 4+-5/5mp, otherwise 5/5 motor power throughout. Right hip with 0 degrees internal rotation/80 degrees external rotation without pain Left hip with 0 degrees internal rotation/70 degrees external rotation with no pain.  Sensory: Intact the tactile stimulation in both lower extremities.  Muscle stretch reflexes: +2 KJ, +2 AJ and symmetric. right biceps 0/+1, right BR 0/+1, right TR +2/3. left biceps 0/+1, left BR,0/+1, left TR +2/3. (-)bilateral inverted radial reflex. No Babinski on left, (+) on right.  Functional status: Patient ambulated with RW with mildly wide based gait with supervision. Patient was able to manage the rolling walker well. shallow HS bilaterally, no slap.  Negative Trendelenburg test, bilaterally and without pain.

## 2024-05-01 NOTE — ASSESSMENT
[FreeTextEntry1] : Patient is a 63-year-old male history of ID, cervical myelopathy, cervical fusion who suffered a fall on April 27, 2024.  Patient's left hip pain predominantly resolved and left hip examination benign.  Recommend continue Tylenol as needed and for patient to continue his outpatient physical therapy. .   I spent a total of 20 minutes on the date of the encounter evaluating and treating the patient including the discussion of treatment options.

## 2024-05-24 ENCOUNTER — APPOINTMENT (OUTPATIENT)
Age: 64
End: 2024-05-24

## 2024-06-18 ENCOUNTER — APPOINTMENT (OUTPATIENT)
Dept: GASTROENTEROLOGY | Facility: CLINIC | Age: 64
End: 2024-06-18
Payer: COMMERCIAL

## 2024-06-18 VITALS
SYSTOLIC BLOOD PRESSURE: 147 MMHG | DIASTOLIC BLOOD PRESSURE: 78 MMHG | BODY MASS INDEX: 33.95 KG/M2 | HEIGHT: 68 IN | HEART RATE: 63 BPM | OXYGEN SATURATION: 97 % | WEIGHT: 224 LBS

## 2024-06-18 DIAGNOSIS — Z87.19 PERSONAL HISTORY OF OTHER DISEASES OF THE DIGESTIVE SYSTEM: ICD-10-CM

## 2024-06-18 DIAGNOSIS — K64.8 OTHER HEMORRHOIDS: ICD-10-CM

## 2024-06-18 DIAGNOSIS — K29.70 GASTRITIS, UNSPECIFIED, W/OUT BLEEDING: ICD-10-CM

## 2024-06-18 DIAGNOSIS — K31.A0 GASTRITIS, UNSPECIFIED, W/OUT BLEEDING: ICD-10-CM

## 2024-06-18 PROCEDURE — 99213 OFFICE O/P EST LOW 20 MIN: CPT

## 2024-06-18 NOTE — PHYSICAL EXAM
[None] : no edema [Normal] : normal bowel sounds, non-tender, no masses, soft, no no hepato-splenomegaly [de-identified] : Deferred

## 2024-06-18 NOTE — HISTORY OF PRESENT ILLNESS
[de-identified] : Upper endoscopy performed January 13, 2022 revealed antral reactive gastropathy with intestinal metaplasia. Upper endoscopy performed April 27, 2023 revealed a hiatal hernia and mild chronic inactive gastritis of the body and antrum with no evidence for intestinal metaplasia.  Biopsies were also negative for helical back to pylori.  Biopsies of the GE junction revealed squamous mucosa with reactive change but negative for goblet cell metaplasia. [FreeTextEntry1] : Colonoscopy performed by Dr. Parker Thomas on December 17, 2015 revealed internal hemorrhoids but no evidence for colon polyps

## 2024-06-18 NOTE — ASSESSMENT
[FreeTextEntry1] : Patient is a 63-year-old male with a history of gastroesophageal reflux disease and antral intestinal metaplasia.  His last endoscopy done last year was negative for intestinal metaplasia.  He has a history of internal hemorrhoids.  His last colonoscopy was done in 2015 by Dr. Parker Thomas and revealed only internal hemorrhoids.  He was advised to follow-up in 1 year to discuss a repeat colonoscopy.

## 2024-06-18 NOTE — REASON FOR VISIT
[Follow-up] : a follow-up of an existing diagnosis [FreeTextEntry1] : Patient is a 63-year-old male who returns for routine follow-up.

## 2025-04-29 ENCOUNTER — APPOINTMENT (OUTPATIENT)
Dept: GASTROENTEROLOGY | Facility: CLINIC | Age: 65
End: 2025-04-29
Payer: COMMERCIAL

## 2025-04-29 VITALS
OXYGEN SATURATION: 98 % | DIASTOLIC BLOOD PRESSURE: 82 MMHG | HEART RATE: 69 BPM | HEIGHT: 68 IN | SYSTOLIC BLOOD PRESSURE: 128 MMHG

## 2025-04-29 DIAGNOSIS — Z12.11 ENCOUNTER FOR SCREENING FOR MALIGNANT NEOPLASM OF COLON: ICD-10-CM

## 2025-04-29 DIAGNOSIS — Z12.12 ENCOUNTER FOR SCREENING FOR MALIGNANT NEOPLASM OF COLON: ICD-10-CM

## 2025-04-29 PROCEDURE — 99214 OFFICE O/P EST MOD 30 MIN: CPT

## 2025-04-29 RX ORDER — POLYETHYLENE GLYCOL 3350 AND ELECTROLYTES WITH LEMON FLAVOR 236; 22.74; 6.74; 5.86; 2.97 G/4L; G/4L; G/4L; G/4L; G/4L
236 POWDER, FOR SOLUTION ORAL
Qty: 1 | Refills: 0 | Status: ACTIVE | COMMUNITY
Start: 2025-04-29 | End: 1900-01-01

## 2025-06-19 ENCOUNTER — APPOINTMENT (OUTPATIENT)
Dept: GASTROENTEROLOGY | Facility: CLINIC | Age: 65
End: 2025-06-19

## 2025-06-20 ENCOUNTER — APPOINTMENT (OUTPATIENT)
Dept: GASTROENTEROLOGY | Facility: HOSPITAL | Age: 65
End: 2025-06-20

## 2025-06-20 ENCOUNTER — OUTPATIENT (OUTPATIENT)
Dept: OUTPATIENT SERVICES | Facility: HOSPITAL | Age: 65
LOS: 1 days | End: 2025-06-20
Payer: MEDICARE

## 2025-06-20 ENCOUNTER — TRANSCRIPTION ENCOUNTER (OUTPATIENT)
Age: 65
End: 2025-06-20

## 2025-06-20 DIAGNOSIS — Z12.12 ENCOUNTER FOR SCREENING FOR MALIGNANT NEOPLASM OF RECTUM: ICD-10-CM

## 2025-06-20 DIAGNOSIS — Z98.1 ARTHRODESIS STATUS: Chronic | ICD-10-CM

## 2025-06-20 DIAGNOSIS — Z12.11 ENCOUNTER FOR SCREENING FOR MALIGNANT NEOPLASM OF COLON: ICD-10-CM

## 2025-06-20 PROCEDURE — 45378 DIAGNOSTIC COLONOSCOPY: CPT

## 2025-06-20 PROCEDURE — G0105: CPT

## 2025-06-20 DEVICE — CLIP RESOLUTION 360 235CM: Type: IMPLANTABLE DEVICE | Status: FUNCTIONAL

## (undated) DEVICE — SNARE LRG

## (undated) DEVICE — TUBING IV SET SECONDARY 34"

## (undated) DEVICE — TUBING CANNULA SALTER LABS NASAL ADULT 7FT

## (undated) DEVICE — CATH IV SAFE BC 20G X 1.16" (PINK)

## (undated) DEVICE — CANISTER SUCTION 1200CC 10/SL

## (undated) DEVICE — PACK IV START WITH CHG

## (undated) DEVICE — Device

## (undated) DEVICE — TUBING IV SET GRAVITY 3Y 100" MACRO

## (undated) DEVICE — SUCTION YANKAUER TAPERED BULBOUS NO VENT

## (undated) DEVICE — SENSOR O2 FINGER XL ADULT 24/BX 6BX/CA

## (undated) DEVICE — NDL INJ SCLERO INTERJECT 23G

## (undated) DEVICE — FORMALIN CUPS 10% BUFFERED

## (undated) DEVICE — ENDOCUFF VISION SZ 3 SM PRPL

## (undated) DEVICE — MASK O2 NON REBREATH 3IN1 ADULT

## (undated) DEVICE — CATH ELECHMSTAT  INJ 7FR 210CM

## (undated) DEVICE — SYR IV POSIFLUSH NS 3ML 30/TY

## (undated) DEVICE — TRAP QUICK CATCH  SINGL CHAMBER

## (undated) DEVICE — VALVE BIOPSY

## (undated) DEVICE — SOL IRR POUR H2O 500ML

## (undated) DEVICE — TUBING SUCTION CONN 6FT STERILE

## (undated) DEVICE — SYR LUER SLIP TIP 30CC

## (undated) DEVICE — SYR LUER SLIP TIP 50CC

## (undated) DEVICE — SNARE POLYP SENS 27MM 240CM

## (undated) DEVICE — MARKER ENDO SPOT EX

## (undated) DEVICE — SNARE CAPTIVATOR II RND COLD 10MM

## (undated) DEVICE — TUBE O2 SUPL CRUSH RESIS CONN SOUTHSIDE ONLY

## (undated) DEVICE — SOL IRR NS 0.9% 250ML

## (undated) DEVICE — SYR LUER LOK 30CC

## (undated) DEVICE — STERIS DEFENDO 3-PIECE KIT (AIR/WATER, SUCTION & BIOPSY VALVES)

## (undated) DEVICE — SET IV PUMP BLOOD 1VALVE 180FILTER NON-DEHP

## (undated) DEVICE — SYR ALLIANCE II INFLATION 60ML

## (undated) DEVICE — MASK O2 ADLT POM ELITE W/ MED AND HI CONCEN M TO M 10FT

## (undated) DEVICE — TUBING IV SET SECOND 34" W/O LOK-BLUNT

## (undated) DEVICE — CATH IV SAFE BC 22G X 1" (BLUE)

## (undated) DEVICE — BITE BLOCK MAXI RUBBER STAMP

## (undated) DEVICE — ELCTR GROUNDING PAD ADULT COVIDIEN

## (undated) DEVICE — BRUSH COLONOSCOPY CYTOLOGY

## (undated) DEVICE — FORCEP RADIAL JAW 4 JUMBO 2.8MM 3.2MM 240CM ORANGE DISP

## (undated) DEVICE — POLY TRAP ETRAP

## (undated) DEVICE — ENDOCUFF VISION SZ 2 LG GRN

## (undated) DEVICE — DRSG CURITY GAUZE SPONGE 4 X 4" 12-PLY

## (undated) DEVICE — CATH ELCTR GLIDE PRB 7FR

## (undated) DEVICE — FORCEP RADIAL JAW 4 W NDL 2.4MM 2.8MM 240CM ORANGE DISP

## (undated) DEVICE — PLATE NESSY 170

## (undated) DEVICE — BRUSH CYTO ENDO

## (undated) DEVICE — FORCEP RADIAL JAW 4 W NDL 2.2MM 2.8MM 160CM YELLOW DISP

## (undated) DEVICE — RADIAL JAW 4 LG CAPACITY WITH NDL